# Patient Record
Sex: FEMALE | Race: WHITE | NOT HISPANIC OR LATINO | Employment: STUDENT | URBAN - METROPOLITAN AREA
[De-identification: names, ages, dates, MRNs, and addresses within clinical notes are randomized per-mention and may not be internally consistent; named-entity substitution may affect disease eponyms.]

---

## 2017-06-02 ENCOUNTER — GENERIC CONVERSION - ENCOUNTER (OUTPATIENT)
Dept: OTHER | Facility: OTHER | Age: 22
End: 2017-06-02

## 2017-06-19 ENCOUNTER — ALLSCRIPTS OFFICE VISIT (OUTPATIENT)
Dept: OTHER | Facility: OTHER | Age: 22
End: 2017-06-19

## 2017-07-06 ENCOUNTER — ALLSCRIPTS OFFICE VISIT (OUTPATIENT)
Dept: OTHER | Facility: OTHER | Age: 22
End: 2017-07-06

## 2018-01-14 VITALS
TEMPERATURE: 98.4 F | BODY MASS INDEX: 23.49 KG/M2 | RESPIRATION RATE: 16 BRPM | SYSTOLIC BLOOD PRESSURE: 100 MMHG | DIASTOLIC BLOOD PRESSURE: 62 MMHG | HEIGHT: 65 IN | HEART RATE: 74 BPM | WEIGHT: 141 LBS

## 2018-01-14 VITALS
BODY MASS INDEX: 22.99 KG/M2 | WEIGHT: 138 LBS | HEIGHT: 65 IN | SYSTOLIC BLOOD PRESSURE: 100 MMHG | DIASTOLIC BLOOD PRESSURE: 70 MMHG

## 2018-01-17 NOTE — MISCELLANEOUS
Message   Recorded as Task   Date: 06/01/2017 04:56 PM, Created By: Laisha Thompson   Task Name: Med Renewal Request   Assigned To: Graeme Nascimento   Regarding Patient: Dolores Gutiérrez, Status: Active   CommentGalen Current - 01 Jun 2017 4:56 PM     TASK CREATED  Caller: Self; Renew Medication; [phone] (Home)  pt called - needs 1 refill of her BC to get her to her yearly on 6/19  [place] [address] Jun 2017 7:34 AM     TASK EDITED  rx to pharm        Active Problems    1  Ankle sprain (845 00) (S93 409A)   2  Counseling for initiation of birth control method (V25 02) (Z30 09)   3  Encounter for gynecological examination without abnormal finding (V72 31) (Z01 419)   4  Exposure to STD (V01 6) (Z20 2)   5  Need for prophylactic vaccination and inoculation against influenza (V04 81) (Z23)   6  Need for prophylactic vaccination and inoculation against meningococcus (V03 89) (Z23)   7  Screening for STD (sexually transmitted disease) (V74 5) (Z11 3)    Current Meds   1  Norethin Ace-Eth Estrad-FE 1-20 MG-MCG Oral Tablet; TAKE 1 TABLET DAILY; Therapy: [id number] to (Evaluate:28Jun2016)  Requested for: 79AEU8227; Last   Rx:15Jun2016 Ordered    Allergies    1   No Known Drug Allergies    Plan  Counseling for initiation of birth control method    · From  Norethin Ace-Eth Estrad-FE 1-20 MG-MCG Oral Tablet TAKE 1 TABLET  DAILY To Norethin Ace-Eth Estrad-FE 1-20 MG-MCG Oral Tablet (Loestrin Fe 1/20) TAKE  ONE (1) TABLET(S) DAILY    Signatures   Electronically signed by : Adeline Trujillo, ; Jun 2 2017  7:35AM EST                       (Author)

## 2018-07-09 ENCOUNTER — ANNUAL EXAM (OUTPATIENT)
Dept: OBGYN CLINIC | Facility: MEDICAL CENTER | Age: 23
End: 2018-07-09
Payer: COMMERCIAL

## 2018-07-09 VITALS
SYSTOLIC BLOOD PRESSURE: 106 MMHG | HEIGHT: 64 IN | DIASTOLIC BLOOD PRESSURE: 62 MMHG | BODY MASS INDEX: 24.07 KG/M2 | WEIGHT: 141 LBS

## 2018-07-09 DIAGNOSIS — Z01.419 ENCOUNTER FOR ANNUAL ROUTINE GYNECOLOGICAL EXAMINATION: Primary | ICD-10-CM

## 2018-07-09 DIAGNOSIS — Z11.3 SCREENING EXAMINATION FOR STD (SEXUALLY TRANSMITTED DISEASE): ICD-10-CM

## 2018-07-09 DIAGNOSIS — Z30.011 ORAL CONTRACEPTIVE PRESCRIBED: ICD-10-CM

## 2018-07-09 PROCEDURE — 99395 PREV VISIT EST AGE 18-39: CPT | Performed by: PHYSICIAN ASSISTANT

## 2018-07-09 RX ORDER — NORETHINDRONE ACETATE AND ETHINYL ESTRADIOL AND FERROUS FUMARATE 1MG-20(21)
1 KIT ORAL DAILY
Qty: 28 TABLET | Refills: 11 | Status: SHIPPED | OUTPATIENT
Start: 2018-07-09 | End: 2019-06-10 | Stop reason: SDUPTHER

## 2018-07-09 RX ORDER — NORETHINDRONE ACETATE AND ETHINYL ESTRADIOL AND FERROUS FUMARATE 1MG-20(21)
1 KIT ORAL DAILY
Refills: 0 | COMMUNITY
Start: 2018-06-08 | End: 2018-07-09 | Stop reason: SDUPTHER

## 2018-07-09 NOTE — PROGRESS NOTES
Assessment/Plan  Problem List Items Addressed This Visit     Encounter for annual routine gynecological examination - Primary     Annual exam and pap performed, STD testing done as well  OCP rx refill sent via EMR  RTO 1 year           Other Visit Diagnoses     Oral contraceptive prescribed        Relevant Medications    JUNEL FE 1/20 1-20 MG-MCG per tablet        Gretchen Lyle is a 26 y o  female who presents for annual GYN exam  She denies any changes in Medical, Surgical or Family  Periods are regular every 28-30 days, patient on junel fe 1/20, no problems  Dysmenorrhea:mild, occurring premenstrually  She denies intermenstrual bleeding, spotting, or discharge  She reports that she is sexually active with 1 partner and using condoms and OCP (estrogen/progesterone) for contraception  Regular self breast exam: no    Family history of uterine or ovarian cancer: no  Family history of breast cancer: no  Family history of colon cancer: no    Menstrual History:  OB History      Para Term  AB Living    0 0 0 0 0 0    SAB TAB Ectopic Multiple Live Births    0 0 0 0 0         Patient's last menstrual period was 2018 (exact date)  The following portions of the patient's history were reviewed and updated as appropriate: allergies, current medications, past family history, past medical history, past social history, past surgical history and problem list     Review of Systems  Review of Systems   Constitutional: Negative for chills and fever  Respiratory: Negative for shortness of breath  Cardiovascular: Negative for chest pain  Gastrointestinal: Negative for abdominal pain  Genitourinary: Negative for dysuria, pelvic pain, vaginal bleeding, vaginal discharge and vaginal pain  Negative for breast pain or lumps  Negative for stress urinary incontinence  Neurological: Negative for headaches        Objective   /62   Ht 5' 4" (1 626 m)   Wt 64 kg (141 lb) LMP 07/06/2018 (Exact Date)   Breastfeeding? No   BMI 24 20 kg/m²     Physical Exam   Constitutional: She is oriented to person, place, and time  She appears well-developed and well-nourished  Neck: No thyromegaly present  Cardiovascular: Normal rate and regular rhythm  Pulmonary/Chest: Effort normal and breath sounds normal  Right breast exhibits no mass, no nipple discharge, no skin change and no tenderness  Left breast exhibits no mass, no nipple discharge, no skin change and no tenderness  Abdominal: Soft  There is no tenderness  There is no rebound and no guarding  Genitourinary: There is no rash or lesion on the right labia  There is no rash or lesion on the left labia  Uterus is not enlarged and not tender  Cervix exhibits no motion tenderness and no discharge  Right adnexum displays no mass and no tenderness  Left adnexum displays no mass and no tenderness  No bleeding in the vagina  No vaginal discharge found  Genitourinary Comments: Pap performed   Neurological: She is alert and oriented to person, place, and time  Psychiatric: She has a normal mood and affect  Nursing note and vitals reviewed

## 2018-07-11 ENCOUNTER — TELEPHONE (OUTPATIENT)
Dept: OBGYN CLINIC | Facility: CLINIC | Age: 23
End: 2018-07-11

## 2018-07-11 NOTE — TELEPHONE ENCOUNTER
Gagandeep Gomez from [address] [place] [address] called regarding pap that was sent to them for this patient on 07/09 - she has questions  Please call her back at [phone]  Thanks!

## 2018-07-15 LAB
C TRACH RRNA SPEC QL NAA+PROBE: NOT DETECTED
CLINICAL INFO: ABNORMAL
CYTO CVX: ABNORMAL
CYTOLOGY CMNT CVX/VAG CYTO-IMP: ABNORMAL
DATE PREVIOUS BX: ABNORMAL
GEN CATEG CVX/VAG CYTO-IMP: ABNORMAL
LMP START DATE: ABNORMAL
N GONORRHOEA RRNA SPEC QL NAA+PROBE: NOT DETECTED
QUESTION/PROBLEM: NORMAL
SL AMB CONTAINER TYPE: NORMAL
SL AMB FINAL RESOLUTION: NORMAL
SL AMB PREV. PAP:: ABNORMAL
SL AMB REPORT STATUS: NORMAL
SPECIMEN SOURCE CVX/VAG CYTO: ABNORMAL

## 2018-07-19 ENCOUNTER — TELEPHONE (OUTPATIENT)
Dept: OBGYN CLINIC | Facility: CLINIC | Age: 23
End: 2018-07-19

## 2018-07-19 NOTE — TELEPHONE ENCOUNTER
Left msg for pt to call back    Re: pap results, mildly abnormal but due to age likely to resolve, needs repeat pap 1 year    Please review results and recommendations if pt calls back

## 2018-07-20 NOTE — TELEPHONE ENCOUNTER
Mom called back, relayed the message from [address] [place] [address],[address]  *mom wanted her number off the chart and gave daughters number

## 2018-08-22 ENCOUNTER — TELEPHONE (OUTPATIENT)
Dept: FAMILY MEDICINE CLINIC | Facility: CLINIC | Age: 23
End: 2018-08-22

## 2018-08-22 DIAGNOSIS — L01.00 IMPETIGO: Primary | ICD-10-CM

## 2018-08-22 RX ORDER — CEPHALEXIN 500 MG/1
500 CAPSULE ORAL 3 TIMES DAILY
Qty: 21 CAPSULE | Refills: 0 | Status: SHIPPED | OUTPATIENT
Start: 2018-08-22 | End: 2018-08-29

## 2018-08-22 NOTE — TELEPHONE ENCOUNTER
Pt calling for a refill of her cephalexin 500mg  She was seen a year ago on 07/06/17 for impetigo and is experiencing the same sx  She is working in [place] all day today and is starting classes on Monday and states she does not have time to come in for an appt   She is just trying to be proactive before it gets out of hand

## 2019-06-10 ENCOUNTER — TELEPHONE (OUTPATIENT)
Dept: OBGYN CLINIC | Facility: CLINIC | Age: 24
End: 2019-06-10

## 2019-06-10 DIAGNOSIS — Z30.011 ORAL CONTRACEPTIVE PRESCRIBED: ICD-10-CM

## 2019-06-10 RX ORDER — NORETHINDRONE ACETATE AND ETHINYL ESTRADIOL AND FERROUS FUMARATE 1MG-20(21)
1 KIT ORAL DAILY
Qty: 28 TABLET | Refills: 1 | Status: SHIPPED | OUTPATIENT
Start: 2019-06-10 | End: 2019-07-03 | Stop reason: SDUPTHER

## 2019-07-03 DIAGNOSIS — Z30.011 ORAL CONTRACEPTIVE PRESCRIBED: ICD-10-CM

## 2019-07-03 RX ORDER — NORETHINDRONE ACETATE AND ETHINYL ESTRADIOL AND FERROUS FUMARATE 1MG-20(21)
KIT ORAL
Qty: 28 TABLET | Refills: 1 | Status: SHIPPED | OUTPATIENT
Start: 2019-07-03 | End: 2019-07-31 | Stop reason: SDUPTHER

## 2019-07-30 DIAGNOSIS — Z30.011 ORAL CONTRACEPTIVE PRESCRIBED: ICD-10-CM

## 2019-07-30 RX ORDER — NORETHINDRONE ACETATE AND ETHINYL ESTRADIOL AND FERROUS FUMARATE 1MG-20(21)
KIT ORAL
Qty: 28 TABLET | Refills: 1 | OUTPATIENT
Start: 2019-07-30

## 2019-07-30 NOTE — TELEPHONE ENCOUNTER
Called pt - L/M, per communication consent, informing pt that  She would need an annual appt scheduled prior to her rx for OCP's being refilled  Advised to call office today

## 2019-07-31 RX ORDER — NORETHINDRONE ACETATE AND ETHINYL ESTRADIOL AND FERROUS FUMARATE 1MG-20(21)
1 KIT ORAL DAILY
Qty: 28 TABLET | Refills: 0 | Status: SHIPPED | OUTPATIENT
Start: 2019-07-31 | End: 2019-08-20 | Stop reason: SDUPTHER

## 2019-07-31 NOTE — TELEPHONE ENCOUNTER
WERNER CALLED AND SCHEDULED HER YEARLY APPOINTMENT WITH Kasi Ibarra ON 8/26/19  COULD YOU PLEASE REFILL HER OCP AND WHEN THIS HAS BEEN DONE PLEASE CALL HER AT # NOTED  YOU MAY LEAVE A MESSAGE

## 2019-08-20 DIAGNOSIS — Z30.011 ORAL CONTRACEPTIVE PRESCRIBED: ICD-10-CM

## 2019-08-20 RX ORDER — NORETHINDRONE ACETATE AND ETHINYL ESTRADIOL AND FERROUS FUMARATE 1MG-20(21)
KIT ORAL
Qty: 28 TABLET | Refills: 0 | Status: SHIPPED | OUTPATIENT
Start: 2019-08-20